# Patient Record
Sex: MALE | Race: WHITE | URBAN - METROPOLITAN AREA
[De-identification: names, ages, dates, MRNs, and addresses within clinical notes are randomized per-mention and may not be internally consistent; named-entity substitution may affect disease eponyms.]

---

## 2017-11-09 ENCOUNTER — OFFICE VISIT (OUTPATIENT)
Dept: FAMILY MEDICINE CLINIC | Age: 32
End: 2017-11-09
Payer: COMMERCIAL

## 2017-11-09 VITALS
TEMPERATURE: 98.2 F | OXYGEN SATURATION: 97 % | SYSTOLIC BLOOD PRESSURE: 120 MMHG | DIASTOLIC BLOOD PRESSURE: 72 MMHG | HEIGHT: 67 IN | HEART RATE: 87 BPM | WEIGHT: 171 LBS | BODY MASS INDEX: 26.84 KG/M2

## 2017-11-09 DIAGNOSIS — L25.5 PLANT DERMATITIS: Primary | ICD-10-CM

## 2017-11-09 PROCEDURE — G8419 CALC BMI OUT NRM PARAM NOF/U: HCPCS | Performed by: NURSE PRACTITIONER

## 2017-11-09 PROCEDURE — G8427 DOCREV CUR MEDS BY ELIG CLIN: HCPCS | Performed by: NURSE PRACTITIONER

## 2017-11-09 PROCEDURE — 99202 OFFICE O/P NEW SF 15 MIN: CPT | Performed by: NURSE PRACTITIONER

## 2017-11-09 PROCEDURE — 4004F PT TOBACCO SCREEN RCVD TLK: CPT | Performed by: NURSE PRACTITIONER

## 2017-11-09 PROCEDURE — 96372 THER/PROPH/DIAG INJ SC/IM: CPT | Performed by: NURSE PRACTITIONER

## 2017-11-09 RX ORDER — TRIAMCINOLONE ACETONIDE 1 MG/G
CREAM TOPICAL
Qty: 30 G | Refills: 0 | Status: SHIPPED | OUTPATIENT
Start: 2017-11-09

## 2017-11-09 RX ORDER — TRIAMCINOLONE ACETONIDE 40 MG/ML
40 INJECTION, SUSPENSION INTRA-ARTICULAR; INTRAMUSCULAR ONCE
Status: COMPLETED | OUTPATIENT
Start: 2017-11-09 | End: 2017-11-09

## 2017-11-09 RX ORDER — HYDROXYZINE HYDROCHLORIDE 25 MG/1
25 TABLET, FILM COATED ORAL EVERY 6 HOURS PRN
Qty: 20 TABLET | Refills: 0 | Status: SHIPPED | OUTPATIENT
Start: 2017-11-09 | End: 2017-11-19

## 2017-11-09 RX ORDER — PREDNISONE 20 MG/1
40 TABLET ORAL DAILY
Qty: 10 TABLET | Refills: 0 | Status: SHIPPED | OUTPATIENT
Start: 2017-11-09 | End: 2017-11-14

## 2017-11-09 RX ADMIN — TRIAMCINOLONE ACETONIDE 40 MG: 40 INJECTION, SUSPENSION INTRA-ARTICULAR; INTRAMUSCULAR at 14:08

## 2017-11-09 ASSESSMENT — ENCOUNTER SYMPTOMS
SINUS PRESSURE: 0
WHEEZING: 0
EYE REDNESS: 0
EYE DISCHARGE: 0
VOICE CHANGE: 0
SHORTNESS OF BREATH: 0
CHEST TIGHTNESS: 0
COUGH: 0
SORE THROAT: 0

## 2017-11-09 NOTE — PROGRESS NOTES
Hematological: Negative for adenopathy. All other systems reviewed and are negative. Objective:     Physical Exam   Constitutional: He appears well-developed and well-nourished. HENT:   Head: Normocephalic. Right Ear: Tympanic membrane and external ear normal.   Left Ear: Tympanic membrane and external ear normal.   Nose: Nose normal. Right sinus exhibits no maxillary sinus tenderness and no frontal sinus tenderness. Left sinus exhibits no maxillary sinus tenderness and no frontal sinus tenderness. Mouth/Throat: Oropharynx is clear and moist. No oropharyngeal exudate or posterior oropharyngeal erythema. Eyes: Right eye exhibits no discharge. Left eye exhibits no discharge. Cardiovascular: Normal rate, regular rhythm and normal heart sounds. No murmur heard. Pulmonary/Chest: Effort normal and breath sounds normal. No respiratory distress. He has no wheezes. Lymphadenopathy:     He has no cervical adenopathy. Skin: Skin is warm. Rash (erythematous palpular rash noted to the bilateral wrists and forearms extending up to the upper aspect of the bilateral arms) noted. Rash is papular. He is not diaphoretic. There is erythema. Nursing note and vitals reviewed. /72 (Site: Left Arm, Position: Sitting, Cuff Size: Medium Adult)   Pulse 87   Temp 98.2 °F (36.8 °C) (Oral)   Ht 5' 6.5\" (1.689 m)   Wt 171 lb (77.6 kg)   SpO2 97%   BMI 27.19 kg/m²     Assessment:      1. Plant dermatitis  triamcinolone acetonide (KENALOG-40) injection 40 mg    predniSONE (DELTASONE) 20 MG tablet    triamcinolone (KENALOG) 0.1 % cream    hydrOXYzine (ATARAX) 25 MG tablet     Plan:      Kenalog injection provided in the office. Patient tolerated well. To start PO prednisone tomorrow. Kenalog cream to the sites. Atarax as needed for itching. Wash clothing in hot water. Educational materials provided regarding plant dermatitis. Follow up if symptoms do not improve.     Orders Placed This Encounter

## 2017-11-09 NOTE — PATIENT INSTRUCTIONS
Patient Education        Poison LAZARO[place]CHÂTILLON, [place], and [place]: Care Instructions  Your Care Instructions    Poison ivy, poison oak, and poison sumac are plants that can cause a skin rash upon contact. The red, itchy rash often shows up in lines or streaks and may cause fluid-filled blisters or large, raised hives. The rash is caused by an allergic reaction to an oil in poison ivy, oak, and sumac. The rash may occur when you touch the plant or when you touch clothing, pet fur, sporting gear, gardening tools, or other objects that have come in contact with one of these plants. You cannot catch or spread the rash, even if you touch it or the blister fluid, because the plant oil will already have been absorbed or washed off the skin. The rash may seem to be spreading, but either it is still developing from earlier contact or you have touched something that still has the plant oil on it. Follow-up care is a key part of your treatment and safety. Be sure to make and go to all appointments, and call your doctor if you are having problems. It's also a good idea to know your test results and keep a list of the medicines you take. How can you care for yourself at home? · If your doctor prescribed a cream, use it as directed. If your doctor prescribed medicine, take it exactly as prescribed. Call your doctor if you think you are having a problem with your medicine. · Use cold, wet cloths to reduce itching. · Keep cool, and stay out of the sun. · Leave the rash open to the air. · Wash all clothing or other things that may have come in contact with the plant oil. · Avoid most lotions and ointments until the rash heals. Calamine lotion may help relieve symptoms of a plant rash. Use it 3 or 4 times a day. To prevent poison ivy exposure  If you know that you will be near poison ivy, oak, or sumac, you can try these options:  · Use a product designed to help prevent plant oil from getting on the skin.  These products, such as LAZARO-CHÂTILLON

## 2021-07-15 ENCOUNTER — OFFICE VISIT (OUTPATIENT)
Dept: PRIMARY CARE CLINIC | Age: 36
End: 2021-07-15
Payer: COMMERCIAL

## 2021-07-15 VITALS
BODY MASS INDEX: 28.93 KG/M2 | HEART RATE: 73 BPM | DIASTOLIC BLOOD PRESSURE: 75 MMHG | TEMPERATURE: 98.1 F | WEIGHT: 180 LBS | HEIGHT: 66 IN | OXYGEN SATURATION: 100 % | SYSTOLIC BLOOD PRESSURE: 113 MMHG

## 2021-07-15 DIAGNOSIS — B96.89 ACUTE BACTERIAL SINUSITIS: ICD-10-CM

## 2021-07-15 DIAGNOSIS — J01.90 ACUTE BACTERIAL SINUSITIS: ICD-10-CM

## 2021-07-15 DIAGNOSIS — H65.92 LEFT OTITIS MEDIA WITH EFFUSION: ICD-10-CM

## 2021-07-15 DIAGNOSIS — H65.191 ACUTE OTITIS MEDIA WITH EFFUSION OF RIGHT EAR: Primary | ICD-10-CM

## 2021-07-15 PROCEDURE — 99213 OFFICE O/P EST LOW 20 MIN: CPT | Performed by: NURSE PRACTITIONER

## 2021-07-15 RX ORDER — AMOXICILLIN AND CLAVULANATE POTASSIUM 875; 125 MG/1; MG/1
1 TABLET, FILM COATED ORAL 2 TIMES DAILY
Qty: 20 TABLET | Refills: 0 | Status: SHIPPED | OUTPATIENT
Start: 2021-07-15 | End: 2021-07-25

## 2021-07-15 RX ORDER — FLUTICASONE PROPIONATE 50 MCG
2 SPRAY, SUSPENSION (ML) NASAL DAILY
Qty: 1 BOTTLE | Refills: 0 | Status: SHIPPED | OUTPATIENT
Start: 2021-07-15

## 2021-07-15 ASSESSMENT — ENCOUNTER SYMPTOMS
WHEEZING: 0
EYE REDNESS: 0
SORE THROAT: 0
VOICE CHANGE: 0
SINUS PRESSURE: 0
EYE DISCHARGE: 0
SHORTNESS OF BREATH: 0
COUGH: 1
CHEST TIGHTNESS: 0

## 2021-07-15 ASSESSMENT — PATIENT HEALTH QUESTIONNAIRE - PHQ9
2. FEELING DOWN, DEPRESSED OR HOPELESS: 0
SUM OF ALL RESPONSES TO PHQ QUESTIONS 1-9: 0
1. LITTLE INTEREST OR PLEASURE IN DOING THINGS: 0
SUM OF ALL RESPONSES TO PHQ QUESTIONS 1-9: 0
SUM OF ALL RESPONSES TO PHQ QUESTIONS 1-9: 0
SUM OF ALL RESPONSES TO PHQ9 QUESTIONS 1 & 2: 0

## 2021-07-15 NOTE — PROGRESS NOTES
[facility] [address] [facility] [place] [facility]  [address] [address]  Dept: [phone]  Dept Fax: [phone]     Nandini Hagen is a 29 y.o. male who presents to the urgent care today for his medicalconditions/complaints as noted below. Nandini Hagen is c/o of Sinusitis (sinus congestion/pressure x 1 week, ear pain/pressure x 2 days; Taking sudafed with no relief  )    HPI:      Sinusitis  This is a new problem. Episode onset: 1 week ago. The problem is unchanged. There has been no fever. Associated symptoms include congestion, coughing (improving) and ear pain (right). Pertinent negatives include no chills, headaches, shortness of breath, sinus pressure, sneezing or sore throat. Treatments tried: otc tx. The treatment provided no relief. History reviewed. No pertinent past medical history. Current Outpatient Medications   Medication Sig Dispense Refill    amoxicillin-clavulanate (AUGMENTIN) 875-125 MG per tablet Take 1 tablet by mouth 2 times daily for 10 days 20 tablet 0    fluticasone (FLONASE) 50 MCG/ACT nasal spray 2 sprays by Nasal route daily 1 Bottle 0    triamcinolone (KENALOG) 0.1 % cream Apply topically 2 times daily. (Patient not taking: Reported on 7/15/2021) 30 g 0     No current facility-administered medications for this visit. No Known Allergies    Reviewed PMH, SH, and FH with the patient and updated. Subjective:      Review of Systems   Constitutional: Negative for chills, fatigue and fever. HENT: Positive for congestion, ear pain (right) and postnasal drip. Negative for ear discharge, sinus pressure, sneezing, sore throat and voice change. Eyes: Negative for discharge and redness. Respiratory: Positive for cough (improving). Negative for chest tightness, shortness of breath and wheezing. Cardiovascular: Negative. Negative for chest pain. Musculoskeletal: Negative for myalgias. Skin: Negative for rash.    Neurological: Negative for dizziness, weakness, light-headedness and headaches. Hematological: Negative for adenopathy. All other systems reviewed and are negative. Objective:      Physical Exam  Vitals and nursing note reviewed. Constitutional:       General: He is not in acute distress. Appearance: Normal appearance. He is well-developed. He is not ill-appearing, toxic-appearing or diaphoretic. HENT:      Head: Normocephalic. Right Ear: External ear normal. A middle ear effusion is present. Tympanic membrane is erythematous and bulging. Left Ear: External ear normal. A middle ear effusion is present. Nose: Nose normal.      Right Sinus: No maxillary sinus tenderness or frontal sinus tenderness. Left Sinus: No maxillary sinus tenderness or frontal sinus tenderness. Mouth/Throat:      Pharynx: Oropharyngeal exudate (PND) and posterior oropharyngeal erythema (mild) present. Eyes:      General:         Right eye: No discharge. Left eye: No discharge. Cardiovascular:      Rate and Rhythm: Normal rate and regular rhythm. Heart sounds: Normal heart sounds. No murmur heard. Pulmonary:      Effort: Pulmonary effort is normal. No respiratory distress. Breath sounds: Normal breath sounds. No wheezing or rales. Lymphadenopathy:      Cervical: No cervical adenopathy. Skin:     General: Skin is warm. Findings: No rash. Neurological:      Mental Status: He is alert. /75 (Site: Left Upper Arm, Position: Sitting, Cuff Size: Medium Adult)   Pulse 73   Temp 98.1 °F (36.7 °C) (Infrared)   Ht 5' 6\" (1.676 m)   Wt 180 lb (81.6 kg)   SpO2 100%   BMI 29.05 kg/m²     Assessment:       Diagnosis Orders   1. Acute otitis media with effusion of right ear  amoxicillin-clavulanate (AUGMENTIN) 875-125 MG per tablet   2. Acute bacterial sinusitis  amoxicillin-clavulanate (AUGMENTIN) 875-125 MG per tablet   3.  Left otitis media with effusion  fluticasone (FLONASE) 50 MCG/ACT nasal spray     Plan:      Based on the duration and severity of the symptoms-- I will treat this as bacterial at this time. Patient instructed to complete antibiotic prescription fully. Warm compresses as desired for ear pain. Flonase daily recommended. May use Motrin/Tylenol for fever/pain. Patient agreeable to treatment plan. Educational materials provided on AVS.  Follow up if symptoms do not improve/worsen. Orders Placed This Encounter   Medications    amoxicillin-clavulanate (AUGMENTIN) 875-125 MG per tablet     Sig: Take 1 tablet by mouth 2 times daily for 10 days     Dispense:  20 tablet     Refill:  0    fluticasone (FLONASE) 50 MCG/ACT nasal spray     Si sprays by Nasal route daily     Dispense:  1 Bottle     Refill:  0        Patient given educational materials - see patient instructions. Discussed use, benefit, and side effects of prescribed medications. All patientquestions answered. Pt voiced understanding.     Electronically signed by APARNA Shine CNP on 7/15/2021at 5:27 PM

## 2021-07-15 NOTE — PATIENT INSTRUCTIONS
Patient Education        Sinusitis: Care Instructions  Your Care Instructions     Sinusitis is an infection of the lining of the sinus cavities in your head. Sinusitis often follows a cold. It causes pain and pressure in your head and face. In most cases, sinusitis gets better on its own in 1 to 2 weeks. But some mild symptoms may last for several weeks. Sometimes antibiotics are needed. Follow-up care is a key part of your treatment and safety. Be sure to make and go to all appointments, and call your doctor if you are having problems. It's also a good idea to know your test results and keep a list of the medicines you take. How can you care for yourself at home? · Take an over-the-counter pain medicine, such as acetaminophen (Tylenol), ibuprofen (Advil, Motrin), or naproxen (Aleve). Read and follow all instructions on the label. · If the doctor prescribed antibiotics, take them as directed. Do not stop taking them just because you feel better. You need to take the full course of antibiotics. · Be careful when taking over-the-counter cold or flu medicines and Tylenol at the same time. Many of these medicines have acetaminophen, which is Tylenol. Read the labels to make sure that you are not taking more than the recommended dose. Too much acetaminophen (Tylenol) can be harmful. · Breathe warm, moist air from a steamy shower, a hot bath, or a sink filled with hot water. Avoid cold, dry air. Using a humidifier in your home may help. Follow the directions for cleaning the machine. · Use saline (saltwater) nasal washes. This can help keep your nasal passages open and wash out mucus and bacteria. You can buy saline nose drops at a grocery store or drugstore. Or you can make your own at home by adding 1 teaspoon of salt and 1 teaspoon of baking soda to 2 cups of distilled water. If you make your own, fill a bulb syringe with the solution, insert the tip into your nostril, and squeeze gently.  Cece Baugh your nose.  · Put a hot, wet towel or a warm gel pack on your face 3 or 4 times a day for 5 to 10 minutes each time. · Try a decongestant nasal spray like oxymetazoline (Afrin). Do not use it for more than 3 days in a row. Using it for more than 3 days can make your congestion worse. When should you call for help? Call your doctor now or seek immediate medical care if:    · You have new or worse swelling or redness in your face or around your eyes.     · You have a new or higher fever. Watch closely for changes in your health, and be sure to contact your doctor if:    · You have new or worse facial pain.     · The mucus from your nose becomes thicker (like pus) or has new blood in it.     · You are not getting better as expected. Where can you learn more? Go to https://MarginLeftpeAcertiv.W[employer] Products. org and sign in to your [employer] account. Enter F119 in the [employer] box to learn more about \"Sinusitis: Care Instructions. \"     If you do not have an account, please click on the \"Sign Up Now\" link. Current as of: December 2, 2020               Content Version: 12.9  © 2006-2021 [employer]. Care instructions adapted under license by [facility] ([facility]). If you have questions about a medical condition or this instruction, always ask your healthcare professional. [place] [address] any warranty or liability for your use of this information. Patient Education        Ear Infection (Otitis Media): Care Instructions  Overview     An ear infection may start with a cold and affect the middle ear (otitis media). It can hurt a lot. Most ear infections clear up on their own in a couple of days and do not need antibiotics. Also, antibiotics do not work against viruses, which may be the cause of your infection. Regular doses of pain relievers are the best way to reduce your fever and help you feel better. Follow-up care is a key part of your treatment and safety.  Be sure to make and go to all appointments, and call your doctor if you are having problems. It's also a good idea to know your test results and keep a list of the medicines you take. How can you care for yourself at home? · Take pain medicines exactly as directed. ? If the doctor gave you a prescription medicine for pain, take it as prescribed. ? If you are not taking a prescription pain medicine, take an over-the-counter medicine, such as acetaminophen (Tylenol), ibuprofen (Advil, Motrin), or naproxen (Aleve). Read and follow all instructions on the label. ? Do not take two or more pain medicines at the same time unless the doctor told you to. Many pain medicines have acetaminophen, which is Tylenol. Too much acetaminophen (Tylenol) can be harmful. · Plan to take a full dose of pain reliever before bedtime. Getting enough sleep will help you get better. · Try a warm, moist washcloth on the ear. It may help relieve pain. · If your doctor prescribed antibiotics, take them as directed. Do not stop taking them just because you feel better. You need to take the full course of antibiotics. When should you call for help? Call your doctor now or seek immediate medical care if:    · You have new or increasing ear pain.     · You have new or increasing pus or blood draining from your ear.     · You have a fever with a stiff neck or a severe headache. Watch closely for changes in your health, and be sure to contact your doctor if:    · You have new or worse symptoms.     · You are not getting better after taking an antibiotic for 2 days. Where can you learn more? Go to https://OncoHealthangel.Sudiksha. org and sign in to your [employer] account. Enter W654 in the [facility] box to learn more about \"Ear Infection (Otitis Media): Care Instructions. \"     If you do not have an account, please click on the \"Sign Up Now\" link.   Current as of: December 2, 2020               Content Version: 12.9  © 0974-1317 Healthwise, Incorporated. Care instructions adapted under license by [facility] ([facility]). If you have questions about a medical condition or this instruction, always ask your healthcare professional. [address] any warranty or liability for your use of this information. Patient Education        Middle Ear Fluid: Care Instructions  Your Care Instructions     Fluid often builds up inside the ear during a cold or allergies. Usually the fluid drains away, but sometimes a small tube in the ear, called the eustachian tube, stays blocked for months. Symptoms of fluid buildup may include:  · Popping, ringing, or a feeling of fullness or pressure in the ear. · Trouble hearing. · Balance problems and dizziness. In most cases, you can treat yourself at home. Follow-up care is a key part of your treatment and safety. Be sure to make and go to all appointments, and call your doctor if you are having problems. It's also a good idea to know your test results and keep a list of the medicines you take. How can you care for yourself at home? · In most cases, the fluid clears up within a few months without treatment. You may need more tests if the fluid does not clear up after 3 months. · If your doctor prescribed antibiotics, take them as directed. Do not stop taking them just because you feel better. You need to take the full course of antibiotics. When should you call for help? Call your doctor now or seek immediate medical care if:    · You have symptoms of infection, such as:  ? Increased pain, swelling, warmth, or redness. ? Pus draining from the area. ? A fever. Watch closely for changes in your health, and be sure to contact your doctor if:    · You notice changes in hearing.     · You do not get better as expected. Where can you learn more? Go to https://el.[employer]homepage Ltd.. org and sign in to your [employer] account.  Enter P228 in the H[employer]iu.[employer] box to learn more about \"Middle Ear Fluid: Care Instructions. \"     If you do not have an account, please click on the \"Sign Up Now\" link. Current as of: December 2, 2020               Content Version: 12.9  © 2006-2021 Healthwise, Incorporated. Care instructions adapted under license by [facility] ([facility]). If you have questions about a medical condition or this instruction, always ask your healthcare professional. [address] any warranty or liability for your use of this information.